# Patient Record
Sex: MALE | Race: BLACK OR AFRICAN AMERICAN | NOT HISPANIC OR LATINO | URBAN - METROPOLITAN AREA
[De-identification: names, ages, dates, MRNs, and addresses within clinical notes are randomized per-mention and may not be internally consistent; named-entity substitution may affect disease eponyms.]

---

## 2017-10-07 ENCOUNTER — EMERGENCY (EMERGENCY)
Facility: HOSPITAL | Age: 37
LOS: 1 days | Discharge: LEFT BEFORE DISCHARGE | End: 2017-10-07
Attending: EMERGENCY MEDICINE | Admitting: EMERGENCY MEDICINE
Payer: SELF-PAY

## 2017-10-07 VITALS
TEMPERATURE: 98 F | OXYGEN SATURATION: 94 % | SYSTOLIC BLOOD PRESSURE: 107 MMHG | DIASTOLIC BLOOD PRESSURE: 62 MMHG | RESPIRATION RATE: 18 BRPM | HEART RATE: 83 BPM

## 2017-10-07 DIAGNOSIS — R51 HEADACHE: ICD-10-CM

## 2017-10-07 DIAGNOSIS — F17.200 NICOTINE DEPENDENCE, UNSPECIFIED, UNCOMPLICATED: ICD-10-CM

## 2017-10-07 DIAGNOSIS — M25.512 PAIN IN LEFT SHOULDER: ICD-10-CM

## 2017-10-07 PROCEDURE — 99053 MED SERV 10PM-8AM 24 HR FAC: CPT

## 2017-10-07 PROCEDURE — 99283 EMERGENCY DEPT VISIT LOW MDM: CPT

## 2017-10-07 PROCEDURE — 99283 EMERGENCY DEPT VISIT LOW MDM: CPT | Mod: 25

## 2017-10-07 RX ORDER — IBUPROFEN 200 MG
600 TABLET ORAL ONCE
Qty: 0 | Refills: 0 | Status: COMPLETED | OUTPATIENT
Start: 2017-10-07 | End: 2017-10-07

## 2017-10-07 RX ADMIN — Medication 600 MILLIGRAM(S): at 01:33

## 2017-10-07 NOTE — ED PROVIDER NOTE - MEDICAL DECISION MAKING DETAILS
c/o L facial pain, s/p fall, no evidence of entrapment, no trismus, no malocclusion  -check CT, xray, motrin c/o L facial pain, s/p fall, no evidence of entrapment, no trismus, no malocclusion, no focal neuro deficits, steady gait  -check CT, xray, motrin

## 2017-10-07 NOTE — ED PROVIDER NOTE - OBJECTIVE STATEMENT
37M no PMH c/o L facial pain. pt states he was injured earlier today.  when pressed pt states he fell.  also c/o L shoulder pain.  no LOC. no vomiting. no dizziness. no photophobia. no discharge.  pt R hand dominant.

## 2017-10-07 NOTE — ED ADULT NURSE NOTE - CHPI ED SYMPTOMS NEG
no fever/no abrasion/no bleeding/no confusion/no loss of consciousness/no vomiting/no numbness/no deformity/no tingling/no weakness

## 2018-04-07 NOTE — ED ADULT NURSE NOTE - FALL HARM RISK TYPE OF ASSESSMENT
Daily Assessment
cards attending, ED RN, patient and his two nephews at bedside, care to be assumed by [facility] in am

## 2018-05-14 ENCOUNTER — EMERGENCY (EMERGENCY)
Facility: HOSPITAL | Age: 38
LOS: 1 days | Discharge: HOME | End: 2018-05-14
Attending: EMERGENCY MEDICINE | Admitting: EMERGENCY MEDICINE
Payer: SELF-PAY

## 2018-05-14 VITALS
WEIGHT: 179.9 LBS | OXYGEN SATURATION: 97 % | SYSTOLIC BLOOD PRESSURE: 126 MMHG | TEMPERATURE: 98 F | RESPIRATION RATE: 18 BRPM | HEART RATE: 84 BPM | DIASTOLIC BLOOD PRESSURE: 78 MMHG

## 2018-05-14 DIAGNOSIS — R10.13 EPIGASTRIC PAIN: ICD-10-CM

## 2018-05-14 DIAGNOSIS — Z88.8 ALLERGY STATUS TO OTHER DRUGS, MEDICAMENTS AND BIOLOGICAL SUBSTANCES: ICD-10-CM

## 2018-05-14 DIAGNOSIS — F17.200 NICOTINE DEPENDENCE, UNSPECIFIED, UNCOMPLICATED: ICD-10-CM

## 2018-05-14 DIAGNOSIS — R05 COUGH: ICD-10-CM

## 2018-05-14 PROCEDURE — 99283 EMERGENCY DEPT VISIT LOW MDM: CPT

## 2018-05-14 RX ADMIN — Medication 30 MILLILITER(S): at 20:11

## 2018-05-14 NOTE — ED PROVIDER NOTE - OBJECTIVE STATEMENT
35 y/o Male presents to the ED c/o a cough and an upset stomach for one day. Pt  reports a cough with phlegm and a possible subjective fever. He denies nausea, vomiting, and diarrhea.

## 2018-05-14 NOTE — ED ADULT TRIAGE NOTE - CHIEF COMPLAINT QUOTE
Pt states acid reflux burning feeling in his throat, with a cough, upset stomach. Denies chest pain and shortness of breath.

## 2018-05-14 NOTE — ED ADULT NURSE NOTE - OBJECTIVE STATEMENT
Pt is a 36y make complaining of cough and upset stomach since yesterday. PT states that he is coughing up green phlem. Pt states that he went to [facility] yesterday in [place] and was given "pills" that did nothing for him so he did not take them today. Pt states that he sometimes has a sore throat.  Pt also states that he is itchy. Pt denies fever, chills, sob, chest pain, nausea, vomiting, diarrhea. Will continue to monitor.

## 2018-05-14 NOTE — ED PROVIDER NOTE - CONSTITUTIONAL, MLM
normal... Pt is awake, alert, oriented to person, place, time/situation and in no apparent distress. Pt appears disheveled somewhat tangential and quirky affect.

## 2018-10-02 ENCOUNTER — EMERGENCY (EMERGENCY)
Facility: HOSPITAL | Age: 38
LOS: 1 days | Discharge: HOME | End: 2018-10-02
Admitting: EMERGENCY MEDICINE
Payer: SELF-PAY

## 2018-10-02 VITALS
RESPIRATION RATE: 20 BRPM | HEART RATE: 86 BPM | DIASTOLIC BLOOD PRESSURE: 65 MMHG | OXYGEN SATURATION: 96 % | SYSTOLIC BLOOD PRESSURE: 101 MMHG | TEMPERATURE: 98 F

## 2018-10-02 PROCEDURE — 99283 EMERGENCY DEPT VISIT LOW MDM: CPT | Mod: 25

## 2018-10-02 PROCEDURE — 99053 MED SERV 10PM-8AM 24 HR FAC: CPT

## 2018-10-02 RX ORDER — HYDROXYZINE HCL 10 MG
50 TABLET ORAL ONCE
Qty: 0 | Refills: 0 | Status: COMPLETED | OUTPATIENT
Start: 2018-10-02 | End: 2018-10-02

## 2018-10-02 RX ORDER — FAMOTIDINE 10 MG/ML
20 INJECTION INTRAVENOUS ONCE
Qty: 0 | Refills: 0 | Status: COMPLETED | OUTPATIENT
Start: 2018-10-02 | End: 2018-10-02

## 2018-10-02 RX ORDER — PERMETHRIN CREAM 5% W/W 50 MG/G
1 CREAM TOPICAL ONCE
Qty: 0 | Refills: 0 | Status: COMPLETED | OUTPATIENT
Start: 2018-10-02 | End: 2018-10-02

## 2018-10-02 RX ADMIN — Medication 50 MILLIGRAM(S): at 04:00

## 2018-10-02 RX ADMIN — PERMETHRIN CREAM 5% W/W 1 APPLICATION(S): 50 CREAM TOPICAL at 03:12

## 2018-10-02 RX ADMIN — FAMOTIDINE 20 MILLIGRAM(S): 10 INJECTION INTRAVENOUS at 04:00

## 2018-10-02 NOTE — ED PROVIDER NOTE - PMH
No pertinent past medical history    No pertinent past medical history GERD (gastroesophageal reflux disease)    No pertinent past medical history

## 2018-10-02 NOTE — ED ADULT NURSE NOTE - NSIMPLEMENTINTERV_GEN_ALL_ED
Implemented All Universal Safety Interventions:  [place] to call system. Call bell, personal items and telephone within reach. Instruct patient to call for assistance. Room bathroom lighting operational. Non-slip footwear when patient is off stretcher. Physically safe environment: no spills, clutter or unnecessary equipment. Stretcher in lowest position, wheels locked, appropriate side rails in place.

## 2018-10-02 NOTE — ED PROVIDER NOTE - PHYSICAL EXAMINATION
Gen - Disheveled M, AxOx3, no acute distress   Skin - warm, dry, intact  HEENT - AT/NC, PERRL, mild conjunctival injection, pupils 3mm b/l, o/p clear, uvula midline, airway patent, neck supple with no step off or midline tenderness, FROM   CV - S1S2, R/R/R  Resp - respiration non-labored, CTAB, symmetric bs b/l, no r/r/w  GI - NABS, soft, ND, NT, no rebound or guarding, no CVAT b/l  MS - moving all extremities, no c/c/e   Neuro - axox3, no focal neuro deficits, ambulatory without gait disturbance Gen - Disheveled M, AxOx3, no acute distress   Skin - warm, dry, intact  HEENT - AT/NC, +lice noted on hairs and neck region, PERRL, o/p clear, uvula midline, airway patent, neck supple with no step off or midline tenderness, FROM   CV - S1S2, R/R/R  Resp - respiration non-labored, CTAB, symmetric bs b/l, no r/r/w  GI - NABS, soft, ND, NT, no rebound or guarding, no CVAT b/l  MS - moving all extremities, no c/c/e   Neuro - axox3, no focal neuro deficits, ambulatory without gait disturbance

## 2018-10-02 NOTE — ED PROVIDER NOTE - OBJECTIVE STATEMENT
39 yo M with PMHx of GERD, presenting c/o scalp and neck itching x 1 wk. Pt reports having scalp itching, extending to the neck and body in the past wk.  Noted something crawling after combing his hair today.  Denies fever, chills, change in ROM/sensation, redness, swelling, paresthesia, purulent d/c, N/V, HA, dizziness, LOC, CP, SOB, and focal weakness

## 2018-10-02 NOTE — ED PROVIDER NOTE - CARE PROVIDERS DIRECT ADDRESSES
[email or web address]

## 2018-10-06 DIAGNOSIS — Z88.8 ALLERGY STATUS TO OTHER DRUGS, MEDICAMENTS AND BIOLOGICAL SUBSTANCES STATUS: ICD-10-CM

## 2018-10-06 DIAGNOSIS — B85.2 PEDICULOSIS, UNSPECIFIED: ICD-10-CM

## 2018-10-06 DIAGNOSIS — Z79.899 OTHER LONG TERM (CURRENT) DRUG THERAPY: ICD-10-CM

## 2018-10-06 DIAGNOSIS — L29.9 PRURITUS, UNSPECIFIED: ICD-10-CM

## 2019-01-19 ENCOUNTER — EMERGENCY (EMERGENCY)
Facility: HOSPITAL | Age: 39
LOS: 1 days | Discharge: HOME | End: 2019-01-19
Admitting: EMERGENCY MEDICINE
Payer: SELF-PAY

## 2019-01-19 VITALS
DIASTOLIC BLOOD PRESSURE: 85 MMHG | RESPIRATION RATE: 16 BRPM | OXYGEN SATURATION: 99 % | HEART RATE: 96 BPM | TEMPERATURE: 98 F | WEIGHT: 195.11 LBS | SYSTOLIC BLOOD PRESSURE: 129 MMHG

## 2019-01-19 DIAGNOSIS — Z79.899 OTHER LONG TERM (CURRENT) DRUG THERAPY: ICD-10-CM

## 2019-01-19 DIAGNOSIS — J06.9 ACUTE UPPER RESPIRATORY INFECTION, UNSPECIFIED: ICD-10-CM

## 2019-01-19 DIAGNOSIS — B86 SCABIES: ICD-10-CM

## 2019-01-19 DIAGNOSIS — Z88.8 ALLERGY STATUS TO OTHER DRUGS, MEDICAMENTS AND BIOLOGICAL SUBSTANCES STATUS: ICD-10-CM

## 2019-01-19 PROBLEM — K21.9 GASTRO-ESOPHAGEAL REFLUX DISEASE WITHOUT ESOPHAGITIS: Chronic | Status: ACTIVE | Noted: 2018-10-02

## 2019-01-19 PROCEDURE — 99282 EMERGENCY DEPT VISIT SF MDM: CPT

## 2019-01-19 RX ORDER — ACETAMINOPHEN 500 MG
650 TABLET ORAL ONCE
Qty: 0 | Refills: 0 | Status: COMPLETED | OUTPATIENT
Start: 2019-01-19 | End: 2019-01-19

## 2019-01-19 RX ORDER — PERMETHRIN CREAM 5% W/W 50 MG/G
1 CREAM TOPICAL ONCE
Qty: 0 | Refills: 0 | Status: COMPLETED | OUTPATIENT
Start: 2019-01-19 | End: 2019-01-19

## 2019-01-19 RX ADMIN — Medication 650 MILLIGRAM(S): at 19:26

## 2019-01-19 RX ADMIN — PERMETHRIN CREAM 5% W/W 1 APPLICATION(S): 50 CREAM TOPICAL at 19:26

## 2019-01-19 NOTE — ED PROVIDER NOTE - OBJECTIVE STATEMENT
39 y/o male with PMHx of GERD presents to the ED with complaints of URI sx x 2 days. Pt appears to be homeless and having scabies/lice. +Scratching. No fever, no chills, no chest pain, no SOB.

## 2019-01-19 NOTE — ED PROVIDER NOTE - MEDICAL DECISION MAKING DETAILS
Pt presenting with URI sx. Vital signs stable. Appears homeless. Will treat for scabies/lice. Pt stable for dc. Will advise to take OTC medications.

## 2019-01-19 NOTE — ED ADULT NURSE NOTE - OBJECTIVE STATEMENT
pt is a 39 y/o male presents to the ED for cold like sx for the past two days and generalized body itching. pt has body lice/scabies crawling during assessment. denies fevers/chills, CP, SOB. pt appears comfortable.

## 2019-01-22 ENCOUNTER — EMERGENCY (EMERGENCY)
Facility: HOSPITAL | Age: 39
LOS: 1 days | Discharge: HOME | End: 2019-01-22
Admitting: EMERGENCY MEDICINE
Payer: SELF-PAY

## 2019-01-22 VITALS
DIASTOLIC BLOOD PRESSURE: 56 MMHG | RESPIRATION RATE: 16 BRPM | HEART RATE: 89 BPM | TEMPERATURE: 98 F | OXYGEN SATURATION: 96 % | WEIGHT: 177.91 LBS | SYSTOLIC BLOOD PRESSURE: 101 MMHG

## 2019-01-22 DIAGNOSIS — R05 COUGH: ICD-10-CM

## 2019-01-22 DIAGNOSIS — Z88.8 ALLERGY STATUS TO OTHER DRUGS, MEDICAMENTS AND BIOLOGICAL SUBSTANCES: ICD-10-CM

## 2019-01-22 DIAGNOSIS — Z79.899 OTHER LONG TERM (CURRENT) DRUG THERAPY: ICD-10-CM

## 2019-01-22 PROCEDURE — 99282 EMERGENCY DEPT VISIT SF MDM: CPT

## 2019-01-22 PROCEDURE — 99053 MED SERV 10PM-8AM 24 HR FAC: CPT

## 2019-01-22 PROCEDURE — 99283 EMERGENCY DEPT VISIT LOW MDM: CPT | Mod: 25

## 2019-01-22 NOTE — ED PROVIDER NOTE - PHYSICAL EXAMINATION
CONSTITUTIONAL: disheveled appearing, NAD   HEAD: Normocephalic; atraumatic.   EYES: PERRL; EOM intact; conjunctiva and sclera clear  ENT: normal nose; no rhinorrhea; pt refusing to let me look at his throat   NECK: Supple; non-tender;   CARDIOVASCULAR: Normal S1, S2; no murmurs, rubs, or gallops. Regular rate and rhythm.   RESPIRATORY: Breathing easily; breath sounds clear and equal bilaterally; no wheezes, rhonchi, or rales.  MSK: FROM at all extremities, normal tone   EXT: No cyanosis or edema; N/V intact  SKIN: Normal for age and race; warm; dry; good turgor; no apparent lesions or rash.

## 2019-01-22 NOTE — ED ADULT NURSE NOTE - OBJECTIVE STATEMENT
37 y/o male with hx of HTN and current smoker arrived to [facility] ER reporting cough and congestion for the past 5 days. Pt was recently seen at [facility] for similar complaints. Upon assessment, rhonchi noted to lung fields, breathing is equal and unlabored, pulses palpable, no visible acute injuries, patient clothing is unkept. pt denies chest pain, headache, dizziness, blurred vision, slurred speech, nausea, vomiting, diarrhea, fever, chills, LOC, SOB, weakness, fatigue, recent travel, recent injury, and palpitations. Care in progress.

## 2019-01-22 NOTE — ED ADULT NURSE NOTE - NSIMPLEMENTINTERV_GEN_ALL_ED
Implemented All Universal Safety Interventions:  [place] to call system. Call bell, personal items and telephone within reach. Instruct patient to call for assistance. Room bathroom lighting operational. Non-slip footwear when patient is off stretcher. Physically safe environment: no spills, clutter or unnecessary equipment. Stretcher in lowest position, wheels locked, appropriate side rails in place.

## 2019-01-22 NOTE — ED PROVIDER NOTE - OBJECTIVE STATEMENT
39 yo M with pmh of GERD c/o cough and  congestion for a "few days." Associated with sore throat. Pt requesting cough medicine. Pt seen at [facility] 2 days ago. Denies fever, chills, cp, sob, n/v.

## 2019-01-22 NOTE — ED PROVIDER NOTE - NSFOLLOWUPINSTRUCTIONS_ED_ALL_ED_FT
Please follow up with your primary care doctor in 24-48 hours. Return to ED for any worsening or emergent symptoms.    Cough    Coughing is a reflex that clears your throat and your airways. Coughing helps to heal and protect your lungs. It is normal to cough occasionally, but a cough that happens with other symptoms or lasts a long time may be a sign of a condition that needs treatment. Coughing may be caused by infections, asthma or COPD, smoking, postnasal drip, gastroesophageal reflux, as well as other medical conditions. Take medicines only as instructed by your health care provider. Avoid environments or triggers that causes you to cough at work or at home.    SEEK IMMEDIATE MEDICAL CARE IF YOU HAVE ANY OF THE FOLLOWING SYMPTOMS: coughing up blood, shortness of breath, rapid heart rate, chest pain, unexplained weight loss or night sweats.

## 2019-01-22 NOTE — ED PROVIDER NOTE - MEDICAL DECISION MAKING DETAILS
39 yo M with pmh of GERD c/o cough and  congestion for a "few days." Associated with sore throat. Pt requesting cough medicine. Pt seen at [facility] 2 days ago. Denies fever, chills, cp, sob, n/v.

## 2019-01-28 ENCOUNTER — EMERGENCY (EMERGENCY)
Facility: HOSPITAL | Age: 39
LOS: 1 days | Discharge: HOME | End: 2019-01-28
Admitting: EMERGENCY MEDICINE
Payer: SELF-PAY

## 2019-01-28 VITALS
SYSTOLIC BLOOD PRESSURE: 135 MMHG | HEART RATE: 96 BPM | RESPIRATION RATE: 18 BRPM | DIASTOLIC BLOOD PRESSURE: 82 MMHG | OXYGEN SATURATION: 97 % | TEMPERATURE: 98 F | WEIGHT: 179.9 LBS

## 2019-01-28 PROCEDURE — 99053 MED SERV 10PM-8AM 24 HR FAC: CPT

## 2019-01-28 PROCEDURE — 99283 EMERGENCY DEPT VISIT LOW MDM: CPT

## 2019-01-28 PROCEDURE — 99283 EMERGENCY DEPT VISIT LOW MDM: CPT | Mod: 25

## 2019-01-28 RX ORDER — DIPHENHYDRAMINE HCL 50 MG
25 CAPSULE ORAL ONCE
Qty: 0 | Refills: 0 | Status: COMPLETED | OUTPATIENT
Start: 2019-01-28 | End: 2019-01-28

## 2019-01-28 RX ADMIN — Medication 25 MILLIGRAM(S): at 03:42

## 2019-01-28 NOTE — ED PROVIDER NOTE - PHYSICAL EXAMINATION
General: Patient is well developed and well nourised. Patient is alert and oriented to person, place and date. Patient is laying comfortably in stretcher and appears in no acute distress.  HEENT: Head is normocephalic and atraumatic. Pupils are equal, round and reactive. Extraocular movements intact. No evidence of nystagmus, conjunctival injection, or scleral icterus. External ears symmetric without evidence of discharge.  Nose is symmetric, non-tender, patent without evidence of discharge. Teeth in good repair. Uvula midline.   Neck: Supple with no evidence of lymphadenopathy.  Full range of motion.  Heart: Regular rate and rhythm. No murmurs, rubs or gallops.   Lungs: Clear to auscultation bilaterally with equal chest expansion. No note of wheezes, rhonchi, rales. Equal chest expansion. No note of retractions.  Abdomen: Bowel sounds present in all four quadrants. Soft, non-tender, non-distended without signs of masses, rebound or guarding. No note of hepatosplenomegaly. No CVA tenderness bilaterally. Negative Chan sign. No pain present over McBurney's point.  Neuro: GCS 15. Moving all extremities without discomfort. Strength is 5/5 arms and legs bilaterally. Sensation intact in all four extremities. gait steady   Skin: Warm, dry and intact without evidence of rashes, bruising, pallor, jaundice or cyanosis.   Psych: Mood and affect appropriate.

## 2019-01-28 NOTE — ED PROVIDER NOTE - OBJECTIVE STATEMENT
States that he has had a cough for the past day. states that she was seen and evaluted earlier this week in this ED for a cough. states that robitussin helps his cough, no fevers or chills, chest pain palpitations. shortness of breat fevers or chills.states that he also has itchy skin stating "there are bugs, they just wont go away".

## 2019-01-28 NOTE — ED PROVIDER NOTE - MEDICAL DECISION MAKING DETAILS
38 year old male presenting to the ed for a cough x1 day and skin rash. states that he has "bugs", with evidence ntoed on clothing, appears to be bed bugs and given benadryl. no evidence of scabies. lungs CTA, no fevers or chills, given robitussin. 38 year old male presenting to the ed for a cough x1 day and skin rash. states that he has "bugs", with evidence ntoed on clothing, appears to be bed bugs and given benadryl. no evidence of scabies. lungs CTA, no fevers or chills, given robitussin. prior to medication administration, patient absconded from the ed

## 2019-01-28 NOTE — ED ADULT NURSE NOTE - OBJECTIVE STATEMENT
pt received into  spot A&Ox3 ambulatory appears comfortable arrives via walk in triage with multiple medical complaints. Pt states he has had a nonproductive cough which he was evaluated in [facility] ED for 5 days ago was given cough syrup with relief requesting this again. Continues to deny CP SOB palpitations lightheadedness dizziness weakness pt received into  spot A&Ox3 ambulatory appears comfortable arrives via walk in triage with multiple medical complaints. Pt states he has had a nonproductive cough which he was evaluated in [facility] ED for 5 days ago was given cough syrup with relief requesting this again. Continues to deny CP SOB palpitations lightheadedness dizziness weakness. Pt requesting lotion for dry skin then itching skin stating he had some "zika bugs" on him recently that "wont go away. No bites noted to skin. Medicated per orders with cough syrup and benadryl. Lung sounds clear bilaterally, respirations appear even and unlabored. Pt escorted to decon room and told he will be provided clothing afterward. upon entry to room pt looked around and said 'I think I'm just gonna go, I got my medications and I don't want to wait for all this to play out. I'm good" Ambulated out of department with steady gait. EVI Fraser made aware, no concerns verbalized

## 2019-02-01 DIAGNOSIS — R05 COUGH: ICD-10-CM

## 2019-02-01 DIAGNOSIS — R21 RASH AND OTHER NONSPECIFIC SKIN ERUPTION: ICD-10-CM

## 2019-10-08 NOTE — ED PROVIDER NOTE - CONDITION AT DISCHARGE:
10/10/2019    Kameron Bojorquez Jr.  [address] [facility] [address]        Dear Kameron Bojorquez Jr.,    This is a reminder that it is time for you to schedule an appointment with Marc Hawkins M.D. for one of the following:     EGD (Upper Endoscopy)    Please call one of the offices listed below to schedule a date and time that is convenient for you.     Sincerely,    Marc Hawkins M.D.     Department of Gastroenterology    [address] [address]     [phone]    Department of Gastroenterology  [place] [address] [place], [place]  [id number] [phone]      Marc Hawkins M.D.   Improved

## 2020-03-18 ENCOUNTER — EMERGENCY (EMERGENCY)
Facility: HOSPITAL | Age: 40
LOS: 1 days | Discharge: HOME | End: 2020-03-18
Admitting: EMERGENCY MEDICINE
Payer: SELF-PAY

## 2020-03-18 VITALS
HEART RATE: 101 BPM | HEIGHT: 73 IN | RESPIRATION RATE: 14 BRPM | SYSTOLIC BLOOD PRESSURE: 113 MMHG | OXYGEN SATURATION: 98 % | TEMPERATURE: 99 F | WEIGHT: 175.05 LBS | DIASTOLIC BLOOD PRESSURE: 67 MMHG

## 2020-03-18 DIAGNOSIS — M79.671 PAIN IN RIGHT FOOT: ICD-10-CM

## 2020-03-18 DIAGNOSIS — Z88.8 ALLERGY STATUS TO OTHER DRUGS, MEDICAMENTS AND BIOLOGICAL SUBSTANCES STATUS: ICD-10-CM

## 2020-03-18 DIAGNOSIS — Q80.9 CONGENITAL ICHTHYOSIS, UNSPECIFIED: ICD-10-CM

## 2020-03-18 PROCEDURE — 99282 EMERGENCY DEPT VISIT SF MDM: CPT

## 2020-03-18 PROCEDURE — 99053 MED SERV 10PM-8AM 24 HR FAC: CPT

## 2020-03-18 RX ORDER — ACETAMINOPHEN 500 MG
975 TABLET ORAL ONCE
Refills: 0 | Status: COMPLETED | OUTPATIENT
Start: 2020-03-18 | End: 2020-03-18

## 2020-03-18 RX ORDER — BACITRACIN ZINC 500 UNIT/G
1 OINTMENT IN PACKET (EA) TOPICAL ONCE
Refills: 0 | Status: COMPLETED | OUTPATIENT
Start: 2020-03-18 | End: 2020-03-18

## 2020-03-18 RX ADMIN — Medication 1 APPLICATION(S): at 02:48

## 2020-03-18 RX ADMIN — Medication 975 MILLIGRAM(S): at 02:49

## 2020-03-18 NOTE — ED PROVIDER NOTE - CLINICAL SUMMARY MEDICAL DECISION MAKING FREE TEXT BOX
AFVSS at time of d/c, pt non-toxic appearing, results, ddx, and f/u plans discussed with pt at bedside, d/c'd home to f/u with PMD, strict return precautions discussed, prompt return to ER for any worsening or new sx, pt verbalized understanding. 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

## 2020-03-18 NOTE — ED PROVIDER NOTE - OBJECTIVE STATEMENT
38 yo M with PMHx of GERD, undomiciled, presenting c/o R foot pain x few days.  Pt reports having itching to b/l feet, has been scratching the R one and noted cracks to the heel with pain to the side of the foot. Denies direct trauma, fall, fever, chills, insect bites, rash, paresthesia, numbness, tingling, redness, bleeding, d/c, HA, dizziness, SOB, CP, palpitations, N/V, focal weakness, neck/back pain, and calf pain.

## 2020-03-18 NOTE — ED PROVIDER NOTE - PATIENT PORTAL LINK FT
You can access the FollowMyHealth Patient Portal offered by [facility] by registering at the following website: [email or web address]. By joining [employer]’s FollowMyHealth portal, you will also be able to view your health information using other applications (apps) compatible with our system.

## 2020-03-18 NOTE — ED PROVIDER NOTE - PHYSICAL EXAMINATION
Gen - WDWN, NAD, comfortable and non-toxic appearing  Skin - warm, dry, xeroderma to R foot, heel and lateral aspect of the foot with excoriation, no burrowing, d/c, bleeding, fluctuance, erythema, edema, ecchymosis, crepitus, streaking, or warmth  HEENT - AT/NC, airway patent, neck supple   CV - S1S2, R/R/R  Resp - CTAB, no r/r/w  GI - soft, ND, NT, no CVAT b/l   MS - w/w/p, no c/c/e, no focal tenderness on exam, NV intact, +SILT, calves supple and NT, compartment soft b/l   Neuro - AxOx3, ambulatory without gait disturbance

## 2020-03-18 NOTE — ED PROVIDER NOTE - NSFOLLOWUPCLINICS_GEN_ALL_ED_FT
[facility]  Podiatry  [address] [place] [address]  Phone: [phone]  Fax:   Follow Up Time:

## 2020-06-07 ENCOUNTER — EMERGENCY (EMERGENCY)
Facility: HOSPITAL | Age: 40
LOS: 1 days | Discharge: HOME | End: 2020-06-07
Admitting: EMERGENCY MEDICINE
Payer: SELF-PAY

## 2020-06-07 VITALS
TEMPERATURE: 98 F | WEIGHT: 214.95 LBS | HEIGHT: 76 IN | HEART RATE: 74 BPM | OXYGEN SATURATION: 97 % | DIASTOLIC BLOOD PRESSURE: 75 MMHG | RESPIRATION RATE: 18 BRPM | SYSTOLIC BLOOD PRESSURE: 127 MMHG

## 2020-06-07 DIAGNOSIS — Z88.8 ALLERGY STATUS TO OTHER DRUGS, MEDICAMENTS AND BIOLOGICAL SUBSTANCES STATUS: ICD-10-CM

## 2020-06-07 DIAGNOSIS — J02.9 ACUTE PHARYNGITIS, UNSPECIFIED: ICD-10-CM

## 2020-06-07 PROCEDURE — 99053 MED SERV 10PM-8AM 24 HR FAC: CPT

## 2020-06-07 PROCEDURE — 99282 EMERGENCY DEPT VISIT SF MDM: CPT

## 2020-06-07 RX ORDER — ACETAMINOPHEN 500 MG
650 TABLET ORAL ONCE
Refills: 0 | Status: COMPLETED | OUTPATIENT
Start: 2020-06-07 | End: 2020-06-07

## 2020-06-07 RX ORDER — LIDOCAINE 4 G/100G
10 CREAM TOPICAL ONCE
Refills: 0 | Status: COMPLETED | OUTPATIENT
Start: 2020-06-07 | End: 2020-06-07

## 2020-06-07 NOTE — ED ADULT NURSE NOTE - OBJECTIVE STATEMENT
39y male presents to ED c/o sore throat. Pt states he has had a sore throat since last night. Pt speaking in full and complete sentences. Denies cough, fever, runny nose, pmh. A&Ox4.

## 2020-06-07 NOTE — ED PROVIDER NOTE - OBJECTIVE STATEMENT
39 year old male with no PMHx presents with sore throat x one day. requesting food and drink. tolerating secretions   Denies cough, SOB, CP, palpitations, wheezing, abdominal pain, N/V/D/C, change in urinary/bowel function, dysuria, hematuria, flank pain, malaise, rash, HA, and dizziness.  No recent travel or sick contact noted.

## 2020-06-07 NOTE — ED PROVIDER NOTE - NS ED ROS FT
· CONSTITUTIONAL: no fever and no chills.  - HEENT: +sore throat  · CARDIOVASCULAR: normal rate and rhythm, no chest pain and no edema.  · RESPIRATORY: no chest pain, no cough, and no shortness of breath.  · GASTROINTESTINAL: no abdominal pain, no bloating, no constipation, no diarrhea, no nausea and no vomiting.  · MUSCULOSKELETAL: no back pain, no musculoskeletal pain, no neck pain, and no weakness.  · SKIN: no abrasions, no jaundice, no lesions, no pruritis, and no rashes.  · NEURO: no loss of consciousness, no gait abnormality, no headache, no sensory deficits, and no weakness.  · PSYCHIATRIC: no known mental health issues.

## 2020-06-07 NOTE — ED PROVIDER NOTE - PATIENT PORTAL LINK FT
You can access the FollowMyHealth Patient Portal offered by [facility] by registering at the following website: [email or web address]. By joining [facility]’s FollowMyHealth portal, you will also be able to view your health information using other applications (apps) compatible with our system.

## 2020-06-07 NOTE — ED PROVIDER NOTE - PHYSICAL EXAMINATION
VITAL SIGNS: I have reviewed nursing notes and confirm.  CONSTITUTIONAL: Well-developed; well-nourished; in no acute distress.  SKIN: Skin is warm and dry, no acute rash.  HEAD: Normocephalic; atraumatic.  EYES: PERRL, EOM intact; conjunctiva and sclera clear.  EARS: tympanic membranes clear bilaterally, No redness, normal landmarks and light reflexes, canal clear without cerumen impaction  THROAT: moist mucous membranes, normal dentition, no erythema, no swelling, no exudates, no drooling, no PTA, uvula midline  NECK: Supple; non tender.  CARD: S1, S2 normal; no murmurs, gallops, or rubs. Regular rate and rhythm.  RESP: No wheezes, rales or rhonchi.  ABD: Normal bowel sounds; soft; non-distended; non-tender;  no palpable or pulsating mass; no hepatosplenomegaly.  EXT: Normal ROM. No clubbing, cyanosis or edema.  NEURO: Alert, oriented. Grossly unremarkable.  PSYCH: Cooperative, appropriate.

## 2020-09-01 ENCOUNTER — EMERGENCY (EMERGENCY)
Facility: HOSPITAL | Age: 40
LOS: 1 days | Discharge: HOME | End: 2020-09-01
Admitting: EMERGENCY MEDICINE
Payer: SELF-PAY

## 2020-09-01 VITALS
DIASTOLIC BLOOD PRESSURE: 63 MMHG | HEIGHT: 75 IN | RESPIRATION RATE: 18 BRPM | HEART RATE: 80 BPM | OXYGEN SATURATION: 99 % | TEMPERATURE: 98 F | WEIGHT: 190.04 LBS | SYSTOLIC BLOOD PRESSURE: 109 MMHG

## 2020-09-01 DIAGNOSIS — L85.0 ACQUIRED ICHTHYOSIS: ICD-10-CM

## 2020-09-01 DIAGNOSIS — M79.672 PAIN IN LEFT FOOT: ICD-10-CM

## 2020-09-01 PROCEDURE — 99282 EMERGENCY DEPT VISIT SF MDM: CPT

## 2020-09-01 PROCEDURE — 99053 MED SERV 10PM-8AM 24 HR FAC: CPT

## 2020-09-01 NOTE — ED PROVIDER NOTE - OBJECTIVE STATEMENT
41 yo M with PMHx of GERD, undomiciled, presenting c/o L foot pain x 1d.  Pt reports atraumatic L foot pain with dry skin and scabs.  Have been picking on it and reports subjective swelling to the region.  Denies fever, chills, numbness, tingling, redness, rash, itching, burn, insect bite, bleeding, dc, calf pain, HA, dizziness, CP, SOB, palpitations, N/V, and trauma.

## 2020-09-01 NOTE — ED PROVIDER NOTE - PHYSICAL EXAMINATION
Gen - Unkempt M. 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

## 2020-09-01 NOTE — ED PROVIDER NOTE - CLINICAL SUMMARY MEDICAL DECISION MAKING FREE TEXT BOX
pt p/w atraumatic foot pain, no s/s of infection on exam, NV intact, FROM, ambulatory with steady gait, noted xeroderma with self inflicted wounds/scabs, given vaseline and education, f/u with podiatry clinic, medically stable for dc

## 2020-09-01 NOTE — ED PROVIDER NOTE - NSFOLLOWUPCLINICS_GEN_ALL_ED_FT
[facility]  Podiatry  [address] [place] [address]  Phone: [phone]  Fax:   Follow Up Time:

## 2020-09-01 NOTE — ED PROVIDER NOTE - PATIENT PORTAL LINK FT
You can access the FollowMyHealth Patient Portal offered by [facility] by registering at the following website: [email or web address]. By joining [employer]’s FollowMyHealth portal, you will also be able to view your health information using other applications (apps) compatible with our system.

## 2020-09-01 NOTE — ED PROVIDER NOTE - NSFOLLOWUPINSTRUCTIONS_ED_ALL_ED_FT
Follow up with your primary care doctor or clinics listed below if you do not have a doctor,    [address] [address]  To make an appointment, call [phone]    [facility]  Address: [address] [place] [id number]  Appointment Center: [phone] ([phone])     Return immediately for any new or worsening symptoms or any new concerns

## 2020-12-09 NOTE — ED ADULT NURSE NOTE - CHPI ED NUR SYMPTOMS NEG
Routing refill request to provider for review/approval because:  Labs not current:  creatinine  BP fails protocol.    Zuleyma Garcia RN, [facility] Triage             no tingling/no numbness/no deformity/no stiffness/no fever

## 2021-01-13 ENCOUNTER — EMERGENCY (EMERGENCY)
Facility: HOSPITAL | Age: 41
LOS: 1 days | Discharge: HOME | End: 2021-01-13
Admitting: EMERGENCY MEDICINE
Payer: SELF-PAY

## 2021-01-13 VITALS
DIASTOLIC BLOOD PRESSURE: 62 MMHG | SYSTOLIC BLOOD PRESSURE: 116 MMHG | OXYGEN SATURATION: 98 % | WEIGHT: 186.07 LBS | HEART RATE: 85 BPM | RESPIRATION RATE: 16 BRPM | TEMPERATURE: 99 F | HEIGHT: 75 IN

## 2021-01-13 DIAGNOSIS — M79.671 PAIN IN RIGHT FOOT: ICD-10-CM

## 2021-01-13 PROCEDURE — 99283 EMERGENCY DEPT VISIT LOW MDM: CPT

## 2021-01-13 RX ORDER — LIDOCAINE 4 G/100G
1 CREAM TOPICAL ONCE
Refills: 0 | Status: COMPLETED | OUTPATIENT
Start: 2021-01-13 | End: 2021-01-13

## 2021-01-13 RX ORDER — ACETAMINOPHEN 500 MG
650 TABLET ORAL ONCE
Refills: 0 | Status: COMPLETED | OUTPATIENT
Start: 2021-01-13 | End: 2021-01-13

## 2021-01-13 RX ADMIN — Medication 650 MILLIGRAM(S): at 10:43

## 2021-01-13 RX ADMIN — LIDOCAINE 1 PATCH: 4 CREAM TOPICAL at 10:44

## 2021-01-13 NOTE — ED PROVIDER NOTE - OBJECTIVE STATEMENT
39 y/o M (currently un-domiciled) with PMHx of GERD presents to the ED for R foot pain. Pt reports he has been walking a lot recently and began to develop R foot pain. He came to the ED today requesting pain medications and some time to rest. He denies falls, injuries, and trauma.

## 2021-01-13 NOTE — ED PROVIDER NOTE - PATIENT PORTAL LINK FT
You can access the FollowMyHealth Patient Portal offered by [facility] by registering at the following website: [email or web address]. By joining [employer]’s FollowMyHealth portal, you will also be able to view your health information using other applications (apps) compatible with our system.

## 2021-01-13 NOTE — ED PROVIDER NOTE - CLINICAL SUMMARY MEDICAL DECISION MAKING FREE TEXT BOX
39 y/o M presenting with R foot pain. Pt is un-domiciled and has been walking a lot recently which has caused R foot pain. On exam, Pt appears well and in no apparent distress. Will order pain medications. Stable for D/C afterwards. 39 y/o M presenting with R foot pain. Pt is un-domiciled and has been walking a lot recently which has caused R foot pain. On exam, Pt appears well and in no apparent distress. No clinical indication for imaging at this time. Will order pain medications. Stable for D/C afterwards.

## 2021-02-14 ENCOUNTER — EMERGENCY (EMERGENCY)
Facility: HOSPITAL | Age: 41
LOS: 1 days | Discharge: HOME | End: 2021-02-14
Admitting: EMERGENCY MEDICINE
Payer: SELF-PAY

## 2021-02-14 VITALS
OXYGEN SATURATION: 95 % | HEART RATE: 99 BPM | WEIGHT: 139.99 LBS | DIASTOLIC BLOOD PRESSURE: 56 MMHG | RESPIRATION RATE: 18 BRPM | TEMPERATURE: 98 F | HEIGHT: 75 IN | SYSTOLIC BLOOD PRESSURE: 98 MMHG
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
## 2021-02-14 DIAGNOSIS — Z88.8 ALLERGY STATUS TO OTHER DRUGS, MEDICAMENTS AND BIOLOGICAL SUBSTANCES: ICD-10-CM

## 2021-02-14 DIAGNOSIS — M25.571 PAIN IN RIGHT ANKLE AND JOINTS OF RIGHT FOOT: ICD-10-CM
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
## 2021-02-14 DIAGNOSIS — Z79.899 OTHER LONG TERM (CURRENT) DRUG THERAPY: ICD-10-CM

## 2021-02-14 DIAGNOSIS — S93.401A SPRAIN OF UNSPECIFIED LIGAMENT OF RIGHT ANKLE, INITIAL ENCOUNTER: ICD-10-CM
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
## 2021-02-14 PROCEDURE — 99283 EMERGENCY DEPT VISIT LOW MDM: CPT

## 2021-02-14 PROCEDURE — 99053 MED SERV 10PM-8AM 24 HR FAC: CPT

## 2021-02-14 RX ORDER — ACETAMINOPHEN 500 MG
650 TABLET ORAL ONCE
Refills: 0 | Status: COMPLETED | OUTPATIENT
Start: 2021-02-14 | End: 2021-02-14

## 2021-02-14 RX ADMIN — Medication 650 MILLIGRAM(S): at 05:58

## 2021-02-14 NOTE — ED PROVIDER NOTE - PATIENT PORTAL LINK FT
You can access the FollowMyHealth Patient Portal offered by [facility] by registering at the following website: [email or web address]. By joining [employer]’s FollowMyHealth portal, you will also be able to view your health information using other applications (apps) compatible with our system.

## 2021-02-14 NOTE — ED ADULT NURSE NOTE - NSSEPSISSUSPECTED_ED_A_ED
impaired balance/decreased strength/Patient leans slightly to the left when trying to negotiate obstacles in room and hallway
No

## 2021-02-14 NOTE — ED PROVIDER NOTE - PHYSICAL EXAMINATION
GEN: Well appearing, well nourished, awake, alert, oriented to person, place, time/situation and in no apparent distress.  ENT: Airway patent, Nasal mucosa clear. Mouth with normal mucosa.  EYES: Clear bilaterally.  RESPIRATORY: Breathing comfortably with normal RR.  MSK: Range of motion is not limited, no deformities noted.  RIGHT ANKLE: no swelling, no bony tenderness, NVI lateral malleolus & fibulotalar ligament tender, swollen, pain with inversion, joint stable, DP and PT pulses intact, Distal sensory exam grossly normal, ROM limited by pain, STRENGTH: 5/5    NEURO: Alert and oriented, no focal deficits.  SKIN: Skin normal color for race, warm, dry and intact. No evidence of rash.  PSYCH: Alert and oriented to person, place, time/situation. normal mood and affect. no apparent risk to self or others.

## 2021-02-14 NOTE — ED PROVIDER NOTE - OBJECTIVE STATEMENT
40 year old male with no reported PMHx, homeless presents with right ankle pain x couple days. Ambulating with steady gait, was seen in a different ED yesterday for the same complaint. Denies numbness or tingling.  Denies head trauma, LOC, break in the skin, paresthesia, numbness, tingling, redness, bleeding, d/c, HA, dizziness, SOB, CP, palpitations, N/V, focal weakness, neck/back pain, and malaise. Pt is ambulatory s/p fall

## 2021-02-14 NOTE — ED ADULT TRIAGE NOTE - CHIEF COMPLAINT QUOTE
Pt walked in c/o R ankle pain x2 days. Ambulating with steady gait, was seen in a different ED yesterday for the same complaint. Denies numbness or tingling.

## 2021-02-14 NOTE — ED PROVIDER NOTE - CLINICAL SUMMARY MEDICAL DECISION MAKING FREE TEXT BOX
right ankle sprain. no indication for additional imaging. states was imaged twice in the past couple days at different EDs.  will give tylenol. able to ambulate with no difficulty

## 2021-02-20 NOTE — ED PROVIDER NOTE - NSFOLLOWUPINSTRUCTIONS_ED_ALL_ED_FT
pulseless, pale, (+) OP secretions, ETT placed by EMS   NCAT pupils fixed  heart pulseless   lungs clear with BVM   abd distended   peripheral contractures 1)  PLEASE FOLLOW-UP WITH YOUR PRIMARY CARE DOCTOR OR REFERRED SPECIALIST IN 1-2 DAYS.     2)  BRING ALL PAPERWORK FROM TODAY'S EMERGENCY ROOM  VISIT TO YOUR FOLLOW-UP VISIT.  IF YOU DO NOT HAVE A PRIMARY CARE DOCTOR PLEASE REFER TO THE OFFICE/CLINIC INFORMATION GIVEN ABOVE.  YOU MAY ALSO CALL [phone] AND ASK FOR MS. JENY JAMISON.  SHE CAN HELP YOU MAKE A FOLLOW-UP APPOINTMENT.  HER HOURS ARE 11AM-7PM MONDAY - FRIDAY.    3) PLEASE RETURN TO THE ER IMMEDIATELY OR CALL 911 FOR ANY HIGH FEVER, TROUBLE BREATHING, CHEST PAIN, WEAKNESS, VOMITING, SEVERE PAIN, OR ANY OTHER CONCERNS.

## 2021-03-06 NOTE — ED ADULT NURSE NOTE - NSFALLRSKUNASSIST_ED_ALL_ED
Progress Note    Patient: Beka Heath Date: 3/6/2021   male, 58 year old  Admit Date: 3/2/2021   Attending: Rd Gasca MD                  SUBJECTIVE:  Patient seen and examined by me in follow up for Acute respiratory failure with hypoxia (CMS/HCC). Pt states that he feels fine and denies any chest pain or SOB.  No fever spikes. Weaned off pressor support.  Oxygen requirement at baseline.     MEDICATIONS: Personally reviewed today in this patient's active orders section of Epic.  • DAPTOmycin  6 mg/kg (Adjusted) Intravenous Q24H   • sodium chloride (PF)  10 mL Injection 3 times per day   • mupirocin (BACTROBAN) 2% nasal ointment  1 application Each Nare 2 times per day   • pneumococcal 23-valent vaccine  0.5 mL Intramuscular Once   • pramipexole  0.5 mg Oral Nightly   • [Held by provider] carvedilol  3.125 mg Oral BID WC   • ipratropium-albuterol  3 mL Nebulization Q6H Resp   • lactobacillus acidophilus  1 tablet Oral BID   • lamoTRIgine  150 mg Oral 2 times per day   • levetiracetam  750 mg Oral BID   • umeclidinium bromide  1 puff Inhalation Daily Resp   • cefTRIAXone (ROCEPHIN) IV  2,000 mg Intravenous Daily   • sodium chloride (PF)  2 mL Intracatheter 2 times per day   • Potassium Standard Replacement Protocol   Does not apply See Admin Instructions   • Magnesium Standard Replacement Protocol   Does not apply See Admin Instructions   • enoxaparin  40 mg Subcutaneous 2 times per day   • pantoprazole  40 mg Oral Nightly   • levothyroxine  75 mcg Oral QAM AC     ALLERGIES: Personally reviewed today in Epic.  ROS: Pertinent systems negative except as above.    PHYSICAL EXAM:      Vital 24 Hour Range Most Recent Value   Temperature Temp  Min: 97.9 °F (36.6 °C)  Max: 98.6 °F (37 °C) 98.3 °F (36.8 °C)   Pulse Pulse  Min: 79  Max: 143 91   Respiratory Resp  Min: 12  Max: 36 (!) 26   Blood Pressure BP  Min: 95/52  Max: 150/115 (!) 150/115   Pulse Oximetry SpO2  Min: 85 %  Max: 100 % (!) 89 %   Arterial BP No  data recorded     O2 O2 Flow Rate (L/min)  Av.9 L/min  Min: 2 L/min   Min taken time: 21 1240  Max: 3 L/min   Max taken time: 21 1045       Vital Most Recent Value First Value   Weight (!) 156.6 kg Weight: (!) 144.9 kg   Height 5' 11\" (180.3 cm) Height: 5' 11\" (180.3 cm)   BMI 48.15 N/A     General: well developed, well nourished and no apparent distress  CV: regular rate and rhythm  Resp: clear to auscultation bilaterally and diminished bilateral bases  Abd: soft, nontender, nondistended and bowel sounds  present  Ext: Bilateral lower extremity edema.  Right lower extremity more swollen warm with sign of status dermatitis      LABS:  Recent Labs   Lab 21  0541 21  0412 21  0537   WBC 9.4 11.1* 10.3   RBC 3.42* 3.73* 3.27*   HGB 8.9* 9.8* 8.6*   HCT 32.1* 34.8* 30.8*   * 169 135*   SEG  --  81 88     Recent Labs   Lab 21  0541 21  0413 21  0537  21  0457  21  1412   SODIUM 137 139 138   < > 139  --  141   POTASSIUM 4.2 4.3 4.3   < > 3.9   < > 3.5   CHLORIDE 102 102 102   < > 101  --  101   CO2 26 28 29   < > 29  --  31   BUN 41* 43* 38*   < > 24*  --  18   CREATININE 1.76* 1.84* 1.80*   < > 1.49*  --  1.14   GLUCOSE 97 140* 102*   < > 114*  --  106*   CALCIUM 7.6* 7.8* 7.3*   < > 7.4*  --  7.7*   ALBUMIN  --   --   --   --  3.2*  --  3.5*   AST  --   --   --   --  21  --  17   GPT  --   --   --   --  11  --  17   BILIRUBIN  --   --   --   --  1.2*  --  1.2*   ALKPT  --   --   --   --  90  --  125*    < > = values in this interval not displayed.       Radiology:  Xr Tube Check    Result Date: 3/3/2021  EXAM: XR TUBE CHECK CHEST DATE: 3/3/2021 9:28 AM HISTORY: central line placement COMPARISON: 2021 FINDINGS: Cardiomegaly similar to prior with vascular congestion. The lung volumes are upper normal. There is no effusion or consolidation. There is no pneumothorax.  The regional skeleton is unremarkable.  Right IJ line overlies superior vena  cava at the junction to the right atria in appropriate position. Presumed minimal basilar atelectasis     IMPRESSION: 1. Right IJ line in appropriate position without complication    Us Lower Extremity Venous Duplex Right    Result Date: 3/2/2021  EXAM: US LOWER EXTREMITY VENOUS DUPLEX RIGHT HISTORY: Worsening shortness of breath. Evaluate for deep venous thrombosis. TECHNIQUE: Right lower extremity vascular ultrasound was obtained with a high-frequency linear array transducer using gray-scale, color flow, spectral Doppler, compression, and augmentation. COMPARISON: 10/24/2020. FINDINGS: Examination is technically difficult due to large body habitus of patient. Mildly prominent right groin lymph node measuring 1.5 cm in short axis diameter. There is a second mildly prominent lymph node measuring 1.9 cm in short axis diameter. Superficial lower extremity edema noted. Evaluation of the common femoral vein, femoral vein, popliteal vein shows normal compression, color Doppler flow and augmentation.  Spectral Doppler evaluation demonstrates normal phasic venous waveforms.. Peroneal and posterior tibial veins show normal color flow. There is no evidence of a Baker's cyst. Limited Doppler evaluation of the left common femoral vein shows normal compression, color Doppler flow, and augmentation.     IMPRESSION: 1. No evidence of deep venous thrombosis in the right lower extremity. 2. There are 2 prominent right inguinal lymph nodes as described above showing short axis dimensions exceeding 1 cm. Follow-up imaging could be considered to better establish the etiology.       Central line:    Available Intravenous Access     PICC Line / CVC Line / PIV Line / Intraosseous Line / Line / UAC Line            Peripheral IV 03/02/21 Right Antecubital 20 3 days    PICC Line Double Lumen 03/05/21 Right Basilic less than 1 day                 Blood Cultures:   No results found for this or any previous visit.       Active Hospital  Problems    Diagnosis   • Sepsis (CMS/HCC)   • Cellulitis of right lower extremity   • Acute respiratory failure with hypoxia (CMS/HCC)   • Congestive heart failure (CHF) (CMS/HCC)   • Epilepsy (CMS/HCC)   • Hx of spontaneous intraparenchymal intracranial hemorrhage due to cerebral AVM   • DELFINA (obstructive sleep apnea)   • Cardiomyopathy (CMS/HCC)   • Chronic depression     MRI tibia-fibula with contrast  1. Extensive skin thickening with subcutaneous edema diffusely throughout the right leg without loculated fluid collection; consistent with cellulitis.  2. No osteomyelitis.  3. Fatty atrophy of the right leg muscles. No fluid or fluid collection along the muscle planes or the deep fascia.    ASSESSMENT AND PLAN:     Severe sepsis with septic shock  Likely secondary to right lower extremity cellulitis rule out necrotizing fasciitis   Lactic acid home 0.8  Procalcitonin 7.81  S/P IVF and IV albumin   Pt requiring pressor support with Levophed  Intensive care and infectious disease following  Center line placed  Patient was treated with vancomycin and ceftriaxone and Flagyl and clindamycin  Wound care seen and evaluated  Follow-up with blood culture  Doppler ultrasound of right lower extremity showed no DVT  MRI of the leg showed extensive cellulitis with no loculated fluid collection   Will continue daptomycin and ceftriaxone and discontinue Flagyl and clindamycin  Weaned off oxygen support    Acute on chronic respiratory failure with hypoxia and hypercapnia  Possibly secondary to decompensated combined systolic and diastolic CHF  In a patient with history of combined systolic and diastolic CHF, COPD and obstructive sleep apnea  Patient presented with shortness of breath dyspnea on exertion and bilateral lower extremity edema  Workup showed elevated BNP more than 10,000 and chest x-ray showed cardiomegaly with pulmonary vascular congestion and pulmonary edema  ABG pH 7.31 pCO2 63 PO2 89 saturating 96% on 2 L  S/p  BiPAP support  Monitor vital signs and pulse ox  Will continue hold IV Lasix due to hypotension  Will cont duo nebs and pulmonary treatment protocol  Will monitor strict input output and daily a.m. weight  Salt and fluid restricted diet  Continue CPAP at night and oxygen support for now  Intensive Care following    Acute kidney injury  Likely prerenal vs ATN from hypotension  Avoid nephrotoxins  Monitor input output and BMP  Renally dose medication  Creatinine improving      COPD  No sign of acute exacerbation  Continue home medications and p.r.n. duo nebs     Hypertension  Monitor blood pressure  Hold antihypertensive because of hypotension     Hypothyroidism  TSH normal  Continue levothyroxine     History of seizure  Continue home medications  Seizure precautions     Normocytic anemia  Chronic  Stable  Monitor CBC     Alcohol abuse  Will put patient on CIWA protocol  Will count multivitamin tablet thiamine and folic acid     Obstructive sleep apnea  Patient is on CPAP with 2 L oxygen  Continue BiPAP for now     Morbid obesity  BMI 44.5  Will  healthy lifestyle and weight loss  Nutrition consult    Smoking status- non smoker    Nutrition status- Morbidly obese    Body mass index is 48.15 kg/m². - class 3 obesity BMI >40    DVT Prophylaxis - Lovenox prophylactic dosing (dose adjusted as per renal function)      Disposition: Will transfer pt to the floor .The patients treatment plans were discussed with patient, RN, consultant(s) and intensivist.    I personally spent 35 minutes today in the care and management of this patient and more than 50% of my time was spent in counseling and coordination of care.     Rd Gasca MD  Hospitalist  3/6/2021  12:43 PM   no

## 2021-03-12 NOTE — ED ADULT TRIAGE NOTE - CHIEF COMPLAINT QUOTE
PT with complaint of right foot pain. states he was here a few days ago for similar complaint. Noted to have dry, cracked skin to the right lateral foot. 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 pylori gastritis/prevpac tx '05, here for polyp surveillance.\par \par Occasional brbpr in past - better off brazil nuts. Sx off/on w/ bRB on TP and hemorrhoidal prolapse at times.  None in last 2months.\par \par Denies abd pain, n/v/wt loss. No heartburn. No dysphagia.\par \par Nonischemic stress '20. CBC normal last month.\par \par Colonoscopy 02-normal\par Colonoscopy 2006 rectal polyp\par Colonoscopy 1/2-13 poor prep, ext hem\par Colonoscopy 6/2014 for rectal bleeding - 2d prep - diverticulosis, int. hem\par \par Soc:  no tobacco or significant EtOH\par FHx: no FHx GI malignancy or IBD\par \par ROS:\par Constitutional:: no weight loss, fevers\par ENT: no deafness\par Eyes: not blind\par Neck: no LN\par Chest: no dyspnea/cough\par Cardiac: no chest pain\par Vascular: no leg swelling\par GI: no abdominal pain, nausea, vomiting, diarrhea, constipation, rectal bleeding, dysphagia, melena unless otherwise noted in HPI\par : no dysuria, dark urine\par Skin: no rashes, jaundice\par Heme: no bleeding\par Endocrine: no DM unless otherwise stated in HPI\par \par Px: (VS noted below)\par General: NAD\par Eyes: anicteric\par Oropharynx:  clear\par Neck: no LN\par Chest: normal respiratory effort\par CVS: regular\par Abd: soft, NT, ND, +BS, no HSM\par Ext: no atrophy\par Neuro: grossly nonfocal\par \par Labs/imaging/prior endoscopic results reviewed to the extent available and noted in HPI\par

## 2022-01-20 NOTE — ED ADULT NURSE NOTE - CHIEF COMPLAINT QUOTE
PT with complaint of right foot pain. states he was here a few days ago for similar complaint. Noted to have dry, cracked skin to the right lateral foot. Mucosal Advancement Flap Text: Given the location of the defect, shape of the defect and the proximity to free margins a mucosal advancement flap was deemed the most appropriate reconstructive option. Incisions were made with a 15 blade scalpel in the appropriate fashion along the cutaneous vermilion border and the mucosal lip. The remaining actinically damaged mucosal tissue was excised.  The mucosal advancement flap was then elevated to the gingival sulcus with care taken to preserve the neurovascular structures and advanced into the primary defect. Care was taken to ensure that precise realignment of the vermilion border was achieved.

## 2022-03-14 NOTE — ED PROVIDER NOTE - DISCHARGE DATE
Patient Information  Name: Teresa Cazares  : 1975  MRN: [id number]     Referring Physician:  Dr. Andrade ref. provider found   Primary Care Physician:  Dr. EDGAR Chery MD   Date of Visit: 2022      Subjective:       Teresa Cazares is a 46 y.o. female who presents for skin lesion    HPI  Patient with new complaint of lesion(s)  Location: bilateral heels  Duration: years  Symptoms: painful  Relieving factors/Previous treatments: none    Patient was last seen:10/19/2021     Prior notes by myself reviewed.   Clinical documentation obtained by nursing staff reviewed.    Review of Systems   Skin: Negative for itching and rash.        Objective:    Physical Exam   Constitutional: She appears well-developed and well-nourished. No distress.   Neurological: She is alert and oriented to person, place, and time. She is not disoriented.   Psychiatric: She has a normal mood and affect.   Skin:   Areas Examined (abnormalities noted in diagram):   RLE Inspected  LLE Inspection Performed             Diagram Legend     Erythematous scaling macule/papule c/w actinic keratosis       Vascular papule c/w angioma      Pigmented verrucoid papule/plaque c/w seborrheic keratosis      Yellow umbilicated papule c/w sebaceous hyperplasia      Irregularly shaped tan macule c/w lentigo     1-2 mm smooth white papules consistent with Milia      Movable subcutaneous cyst with punctum c/w epidermal inclusion cyst      Subcutaneous movable cyst c/w pilar cyst      Firm pink to brown papule c/w dermatofibroma      Pedunculated fleshy papule(s) c/w skin tag(s)      Evenly pigmented macule c/w junctional nevus     Mildly variegated pigmented, slightly irregular-bordered macule c/w mildly atypical nevus      Flesh colored to evenly pigmented papule c/w intradermal nevus       Pink pearly papule/plaque c/w basal cell carcinoma      Erythematous hyperkeratotic cursted plaque c/w SCC      Surgical scar with no sign of skin cancer  recurrence      Open and closed comedones      Inflammatory papules and pustules      Verrucoid papule consistent consistent with wart     Erythematous eczematous patches and plaques     Dystrophic onycholytic nail with subungual debris c/w onychomycosis     Umbilicated papule    Erythematous-base heme-crusted tan verrucoid plaque consistent with inflamed seborrheic keratosis     Erythematous Silvery Scaling Plaque c/w Psoriasis     See annotation      No images are attached to the encounter or orders placed in the encounter.    [] Data reviewed  [] Independent review of test  [] Management discussed with another provider    Assessment / Plan:        Callus  -     ammonium lactate 12 % Crea; Apply 1 application topically 2 (two) times a day.  Dispense: 385 g; Refill: 1 or urea 40% cream           LOS NUMBER AND COMPLEXITY OF PROBLEMS    COMPLEXITY OF DATA RISK TOTAL TIME (m)   89116  34450 [] 1 self-limited or minor problem [x] Minimal to none [] No treatment recommended or patient to monitor 15-29  10-19   14318  50385 Low  [] 2 or > self limited or minor problems  [] 1 stable chronic illness  [] 1 acute, uncomplicated illness or injury Limited (2)  [] Prior external notes from each unique source  [] Review result of each unique test  [] Order each unique test [x]  Low  OTC medications, minor skin biopsy 30-44  20-29   75262  87415 Moderate  [x]  1 or > chronic illness with progression, exacerbation or SE of treatment  []  2 or more stable chronic illnesses  []  1 acute illness with systemic symptoms  []  1 acute complicated injury  []  1 undiagnosed new problem with uncertain prognosis Moderate (1/3 below)  []  3 or more data items        *Now includes assessment requiring independent historian  []  Independent interpretation of a test  []  Discuss management/test with another provider Moderate  []  Prescription drug mgmt  []  Minor surgery with risk discussed  []  Mgmt limited by social determinates 45-59  30-39    15525  48936 High  []  1 or more chronic illness with severe exacerbation, progression or SE of treatment  []  1 acute or chronic illness/injury that poses a threat to life or bodily function Extensive (2/3 below)  []  3 or more data items        *Now includes assessment requiring independent historian.  []  Independent interpretation of a test  []  Discuss management/test with another provider High  []  Major surgery with risk discussed  []  Drug therapy requiring intensive monitoring for toxicity  []  Hospitalization  []  Decision for DNR 60-74  40-54      No follow-ups on file.    Kat Atkinson MD, FAAD  [facility]                 19-Jan-2019

## 2023-01-16 NOTE — ED PROVIDER NOTE - NSTIMEPROVIDERCAREINITIATE_GEN_ER
Impression: Dry eye syndrome of bilateral lacrimal glands: H04.123. Plan: See plan #1. 19-Jan-2019 18:48

## 2023-02-07 ENCOUNTER — EMERGENCY (EMERGENCY)
Facility: HOSPITAL | Age: 43
LOS: 1 days | Discharge: LEFT BEFORE DISCHARGE | End: 2023-02-07
Admitting: EMERGENCY MEDICINE
Payer: SELF-PAY

## 2023-02-07 VITALS
RESPIRATION RATE: 16 BRPM | HEART RATE: 99 BPM | SYSTOLIC BLOOD PRESSURE: 148 MMHG | TEMPERATURE: 97 F | OXYGEN SATURATION: 99 % | DIASTOLIC BLOOD PRESSURE: 72 MMHG

## 2023-02-07 DIAGNOSIS — M79.672 PAIN IN LEFT FOOT: ICD-10-CM

## 2023-02-07 DIAGNOSIS — Z53.21 PROCEDURE AND TREATMENT NOT CARRIED OUT DUE TO PATIENT LEAVING PRIOR TO BEING SEEN BY HEALTH CARE PROVIDER: ICD-10-CM

## 2023-02-07 PROCEDURE — L9991: CPT

## 2023-08-23 ENCOUNTER — EMERGENCY (EMERGENCY)
Facility: HOSPITAL | Age: 43
LOS: 1 days | Discharge: HOME | End: 2023-08-23
Attending: EMERGENCY MEDICINE | Admitting: EMERGENCY MEDICINE
Payer: MEDICAID

## 2023-08-23 VITALS
RESPIRATION RATE: 16 BRPM | TEMPERATURE: 98 F | OXYGEN SATURATION: 94 % | SYSTOLIC BLOOD PRESSURE: 139 MMHG | HEART RATE: 76 BPM | DIASTOLIC BLOOD PRESSURE: 77 MMHG

## 2023-08-23 PROCEDURE — 99283 EMERGENCY DEPT VISIT LOW MDM: CPT

## 2023-08-23 RX ORDER — ACETAMINOPHEN 500 MG
975 TABLET ORAL ONCE
Refills: 0 | Status: COMPLETED | OUTPATIENT
Start: 2023-08-23 | End: 2023-08-23

## 2023-08-23 NOTE — ED PROVIDER NOTE - CONDITION AT DISCHARGE:
ALL YOUR  PREVIOUS MEDS ARE THE SAME  NEW MEDS:  Hydrocodone if needed for pain      Keep the bandage on until Thursday morning  Just sponge bathe until then  On Thursday morning, carefully unwrapped the bandage  There will be some blood spotting on the stitches for a few days  Place Neosporin, or similar over-the-counter antibiotic cream on the stitches twice per day           ACTIVITY:  RESUME FULL ACTIVITY one week  Improved

## 2023-08-23 NOTE — ED ADULT TRIAGE NOTE - CHIEF COMPLAINT QUOTE
Pt undomiciled walk states "my legs hurts from walking, I just want some Tylenol". Pt denies injuries. Pt ambulatory w/ steady gait.

## 2023-08-23 NOTE — ED PROVIDER NOTE - PATIENT PORTAL LINK FT
You can access the FollowMyHealth Patient Portal offered by [facility] by registering at the following website: [email or web address]. By joining [employer]’s FollowMyHealth portal, you will also be able to view your health information using other applications (apps) compatible with our system.

## 2023-08-23 NOTE — ED PROVIDER NOTE - CLINICAL SUMMARY MEDICAL DECISION MAKING FREE TEXT BOX
Patient arrives hemodynamically stable ambulatory with no acute distress endorsing he is requesting a Tylenol.  Patient is not forthcoming with HPI and ROS.  He appears well will discharge

## 2023-08-23 NOTE — ED ADULT NURSE REASSESSMENT NOTE - NS ED NURSE REASSESS COMMENT FT1
Patient left before nursing assessment. patient did not announce why he was leaving. care ongoing. Provider made aware.

## 2023-08-23 NOTE — ED PROVIDER NOTE - NS ED MD DISPO DISCHARGE CCDA
Subjective:  Patient states "I feel fine".  Patient denies any CP, SOB, N/V/D, fever or chills    Brief Hospital Course:  60y Male h/o L TKR 3/18/22 presents with 3 day history of SOB.  F/w NSTEMI, HFrEF, pulm edema with elevated BNP.  Cath revealed LM 50% ulcerated plaque LAD proximal  with colaterals from RCA, Ramus 75%, LCx 99%, RCA 80% mid ulcerated/complex stenosis.    3/29 s/p Off-pump CABG x 3    PAST MEDICAL & SURGICAL HISTORY:  Hypertension    Hyperlipidemia    COVID-19 vaccine series completed    Colon cancer    Prostate cancer  Treated with Radiation 2015    Type 2 diabetes mellitus    Unilateral primary osteoarthritis, left knee    S/P colon resection    S/P hernia repair    ICU Vital Signs Last 24 Hrs  T(C): 36.7 (29 Mar 2022 20:00), Max: 36.9 (29 Mar 2022 04:00)  T(F): 98.1 (29 Mar 2022 20:00), Max: 98.4 (29 Mar 2022 04:00)  HR: 76 (30 Mar 2022 02:00) (63 - 87)  BP: 112/61 (29 Mar 2022 06:00) (112/61 - 112/61)  BP(mean): 65 (29 Mar 2022 06:00) (65 - 65)  ABP: 105/50 (30 Mar 2022 02:00) (97/49 - 146/57)  ABP(mean): 66 (30 Mar 2022 02:00) (64 - 83)  RR: 18 (30 Mar 2022 02:00) (12 - 38)  SpO2: 94% (30 Mar 2022 02:00) (94% - 100%)      I&O's Summary    28 Mar 2022 07:01  -  29 Mar 2022 07:00  --------------------------------------------------------  IN: 309 mL / OUT: 1400 mL / NET: -1091 mL    29 Mar 2022 07:01  -  30 Mar 2022 03:02  --------------------------------------------------------  IN: 2361 mL / OUT: 1000 mL / NET: 1361 mL        Labs:                                   9.5    15.52 )-----------( x        ( 30 Mar 2022 02:32 )             28.3   03-30    138  |  110<H>  |  17.1  ----------------------------<  105<H>  3.9   |  17.0<L>  |  0.50    Ca    6.9<L>      30 Mar 2022 02:06  Phos  2.9     03-28  Mg     1.9     03-30    TPro  4.2<L>  /  Alb  2.8<L>  /  TBili  0.9  /  DBili  x   /  AST  25  /  ALT  16  /  AlkPhos  70  03-30  PT/INR - ( 29 Mar 2022 14:13 )   PT: 17.2 sec;   INR: 1.48 ratio         PTT - ( 29 Mar 2022 14:13 )  PTT:30.9 sec    MEDICATIONS  (STANDING):  ascorbic acid 500 milliGRAM(s) Oral daily  aspirin enteric coated 325 milliGRAM(s) Oral daily  atorvastatin 80 milliGRAM(s) Oral at bedtime  cefuroxime  IVPB 1500 milliGRAM(s) IV Intermittent every 8 hours  chlorhexidine 2% Cloths 1 Application(s) Topical daily  clopidogrel Tablet 75 milliGRAM(s) Oral daily  dextrose 50% Injectable 25 milliLiter(s) IV Push every 15 minutes  dextrose 50% Injectable 50 milliLiter(s) IV Push every 15 minutes  enoxaparin Injectable 40 milliGRAM(s) SubCutaneous every 24 hours  folic acid 1 milliGRAM(s) Oral daily  insulin regular Infusion 2 Unit(s)/Hr (2 mL/Hr) IV Continuous <Continuous>  magnesium sulfate  IVPB 2 Gram(s) IV Intermittent once  multivitamin/minerals 1 Tablet(s) Oral daily  norepinephrine Infusion 0.05 MICROgram(s)/kG/Min (5.25 mL/Hr) IV Continuous <Continuous>  oxybutynin 10 milliGRAM(s) Oral at bedtime  pantoprazole    Tablet 40 milliGRAM(s) Oral daily  polyethylene glycol 3350 17 Gram(s) Oral daily  potassium chloride  10 mEq/50 mL IVPB 10 milliEquivalent(s) IV Intermittent every 1 hour  potassium chloride  10 mEq/50 mL IVPB 10 milliEquivalent(s) IV Intermittent every 1 hour  potassium chloride  10 mEq/50 mL IVPB 10 milliEquivalent(s) IV Intermittent every 1 hour  senna 2 Tablet(s) Oral at bedtime  sodium chloride 0.9%. 1000 milliLiter(s) (10 mL/Hr) IV Continuous <Continuous>  sodium chloride 0.9%. 1000 milliLiter(s) (5 mL/Hr) IV Continuous <Continuous>  vancomycin  IVPB 1000 milliGRAM(s) IV Intermittent every 12 hours        PHYSICAL EXAM:  Neuro: A+O x 3, non-focal, speech clear and intact  CV: regular rate, regular rhythm, +S1S2, no murmurs or rub  Pulm/chest: lung sounds CTA and equal bilaterally, no accessory muscle use noted  Abd: soft, NT, ND  Ext: ACEVEDO x 4, 1 plus pedal edema  Skin: warm, well perfused  Incision: Dressings CDI, Chest Tubes with no airlead         Patient/Caregiver provided printed discharge information.

## 2023-08-23 NOTE — ED PROVIDER NOTE - OBJECTIVE STATEMENT
42-year-old male no past medical history he is a poor historian presents with bilateral feet pain.  Patient requesting a Tylenol.  Patient unable to provide detailed HPI.  Declines full HPI and ROS

## 2023-08-25 DIAGNOSIS — Z88.6 ALLERGY STATUS TO ANALGESIC AGENT: ICD-10-CM

## 2023-08-25 DIAGNOSIS — Z00.00 ENCOUNTER FOR GENERAL ADULT MEDICAL EXAMINATION WITHOUT ABNORMAL FINDINGS: ICD-10-CM

## 2023-08-25 DIAGNOSIS — M79.672 PAIN IN LEFT FOOT: ICD-10-CM

## 2023-08-25 DIAGNOSIS — M79.671 PAIN IN RIGHT FOOT: ICD-10-CM

## 2023-10-27 ENCOUNTER — EMERGENCY (EMERGENCY)
Facility: HOSPITAL | Age: 43
LOS: 1 days | Discharge: LEFT BEFORE DISCHARGE | End: 2023-10-27
Admitting: EMERGENCY MEDICINE
Payer: SELF-PAY

## 2023-10-27 VITALS
DIASTOLIC BLOOD PRESSURE: 71 MMHG | RESPIRATION RATE: 18 BRPM | SYSTOLIC BLOOD PRESSURE: 118 MMHG | TEMPERATURE: 98 F | OXYGEN SATURATION: 98 % | HEART RATE: 79 BPM

## 2023-10-27 PROCEDURE — L9991: CPT

## 2023-10-27 NOTE — ED ADULT TRIAGE NOTE - EXPLANATION OF PATIENT'S REASON FOR LEAVING
when patient was told that he would need to wait for a provider before being medicated for pain, he said he could just go buy motrin and left.

## 2023-10-30 DIAGNOSIS — M79.672 PAIN IN LEFT FOOT: ICD-10-CM

## 2023-10-30 DIAGNOSIS — Z53.21 PROCEDURE AND TREATMENT NOT CARRIED OUT DUE TO PATIENT LEAVING PRIOR TO BEING SEEN BY HEALTH CARE PROVIDER: ICD-10-CM

## 2023-11-04 ENCOUNTER — EMERGENCY (EMERGENCY)
Facility: HOSPITAL | Age: 43
LOS: 1 days | Discharge: LEFT BEFORE DISCHARGE | End: 2023-11-04
Admitting: EMERGENCY MEDICINE
Payer: SELF-PAY

## 2023-11-04 VITALS
TEMPERATURE: 98 F | DIASTOLIC BLOOD PRESSURE: 76 MMHG | HEART RATE: 87 BPM | SYSTOLIC BLOOD PRESSURE: 127 MMHG | RESPIRATION RATE: 18 BRPM | OXYGEN SATURATION: 95 %

## 2023-11-04 DIAGNOSIS — M79.671 PAIN IN RIGHT FOOT: ICD-10-CM

## 2023-11-04 DIAGNOSIS — Z53.21 PROCEDURE AND TREATMENT NOT CARRIED OUT DUE TO PATIENT LEAVING PRIOR TO BEING SEEN BY HEALTH CARE PROVIDER: ICD-10-CM

## 2023-11-04 PROCEDURE — L9991: CPT

## 2023-11-04 NOTE — ED ADULT TRIAGE NOTE - EXPLANATION OF PATIENT'S REASON FOR LEAVING
Pt. became angry once triage to a chair demanding a painkiller, pt. informed a provider needed to see him and they would order medication for him. Pt. stormed out saying "I can buy my own tylenol".

## 2024-11-10 ENCOUNTER — EMERGENCY (EMERGENCY)
Facility: HOSPITAL | Age: 44
LOS: 1 days | Discharge: LEFT BEFORE DISCHARGE | End: 2024-11-10
Admitting: EMERGENCY MEDICINE
Payer: MEDICAID

## 2024-11-10 VITALS
RESPIRATION RATE: 18 BRPM | TEMPERATURE: 98 F | HEART RATE: 91 BPM | DIASTOLIC BLOOD PRESSURE: 89 MMHG | SYSTOLIC BLOOD PRESSURE: 137 MMHG | OXYGEN SATURATION: 98 %

## 2024-11-10 PROCEDURE — L9991: CPT

## 2024-11-14 DIAGNOSIS — Z53.21 PROCEDURE AND TREATMENT NOT CARRIED OUT DUE TO PATIENT LEAVING PRIOR TO BEING SEEN BY HEALTH CARE PROVIDER: ICD-10-CM

## 2024-11-14 DIAGNOSIS — M79.673 PAIN IN UNSPECIFIED FOOT: ICD-10-CM

## 2025-02-17 NOTE — ED PROVIDER NOTE - NS ED MD EM SELECTION
Latest Ref Rng 2/14/2025  10:14 AM   ENDO THYROID LABS-[facility]     TSH 0.30 - 4.20 uIU/mL 7.42 (H)    FREE T4 0.90 - 1.70 ng/dL 1.01       Legend:  (H) High    Improving labs.  Continue current dose of thyroid hormone replacement and recheck labs few days prior to next visit.  Please place lab orders for same lab test.  Please send prescription.  
Per 5/30/24 VV:  Lab only appointment in 2-3 months.  If labs are stable then plan to continue current dose of thyroid medication.  You can continue to follow with primary care provider for ongoing thyroid hormone replacement if labs are stable.  Follow-up with endocrinology as needed.    Labs overdue. Orders extended.  
35099 Exp Problem Focused - Mod. Complex
